# Patient Record
Sex: FEMALE | Race: WHITE | NOT HISPANIC OR LATINO | Employment: OTHER | URBAN - METROPOLITAN AREA
[De-identification: names, ages, dates, MRNs, and addresses within clinical notes are randomized per-mention and may not be internally consistent; named-entity substitution may affect disease eponyms.]

---

## 2017-01-27 DIAGNOSIS — M25.559 ARTHRALGIA OF HIP, UNSPECIFIED LATERALITY: Primary | ICD-10-CM

## 2017-01-30 ENCOUNTER — HOSPITAL ENCOUNTER (OUTPATIENT)
Dept: RADIOLOGY | Facility: HOSPITAL | Age: 52
Discharge: HOME | End: 2017-01-30
Attending: ORTHOPAEDIC SURGERY
Payer: MEDICAID

## 2017-01-30 ENCOUNTER — OFFICE VISIT (OUTPATIENT)
Dept: ORTHOPEDICS | Facility: CLINIC | Age: 52
End: 2017-01-30
Payer: MEDICAID

## 2017-01-30 VITALS — BODY MASS INDEX: 24.24 KG/M2 | WEIGHT: 142 LBS | HEIGHT: 64 IN

## 2017-01-30 DIAGNOSIS — M54.32 BILATERAL SCIATICA: Primary | ICD-10-CM

## 2017-01-30 DIAGNOSIS — M54.31 BILATERAL SCIATICA: Primary | ICD-10-CM

## 2017-01-30 DIAGNOSIS — M25.559 ARTHRALGIA OF HIP, UNSPECIFIED LATERALITY: ICD-10-CM

## 2017-01-30 PROCEDURE — 73521 X-RAY EXAM HIPS BI 2 VIEWS: CPT | Mod: 26,,, | Performed by: RADIOLOGY

## 2017-01-30 PROCEDURE — 99999 PR PBB SHADOW E&M-EST. PATIENT-LVL II: CPT | Mod: PBBFAC,,, | Performed by: ORTHOPAEDIC SURGERY

## 2017-01-30 PROCEDURE — 99212 OFFICE O/P EST SF 10 MIN: CPT | Mod: PBBFAC,PO | Performed by: ORTHOPAEDIC SURGERY

## 2017-01-30 PROCEDURE — 99203 OFFICE O/P NEW LOW 30 MIN: CPT | Mod: S$PBB,,, | Performed by: ORTHOPAEDIC SURGERY

## 2017-01-30 RX ORDER — GABAPENTIN 300 MG/1
CAPSULE ORAL
Refills: 7 | Status: ON HOLD | COMMUNITY
Start: 2017-01-27 | End: 2017-03-11 | Stop reason: HOSPADM

## 2017-01-30 RX ORDER — GABAPENTIN 800 MG/1
800 TABLET ORAL 3 TIMES DAILY
Refills: 3 | COMMUNITY
Start: 2016-11-01

## 2017-01-30 RX ORDER — ALENDRONATE SODIUM 70 MG/1
TABLET ORAL
Refills: 2 | COMMUNITY
Start: 2016-10-31

## 2017-01-30 RX ORDER — GABAPENTIN 600 MG/1
TABLET ORAL
Refills: 7 | Status: ON HOLD | COMMUNITY
Start: 2016-10-30 | End: 2017-03-11 | Stop reason: HOSPADM

## 2017-01-30 RX ORDER — CYCLOBENZAPRINE HCL 10 MG
TABLET ORAL
Refills: 0 | COMMUNITY
Start: 2016-11-01 | End: 2017-01-31

## 2017-01-30 RX ORDER — CLONAZEPAM 1 MG/1
1 TABLET ORAL 2 TIMES DAILY PRN
Refills: 1 | Status: ON HOLD | COMMUNITY
Start: 2016-11-02 | End: 2017-03-11 | Stop reason: HOSPADM

## 2017-01-30 RX ORDER — MIRTAZAPINE 15 MG/1
15 TABLET, FILM COATED ORAL NIGHTLY
Refills: 1 | Status: ON HOLD | COMMUNITY
Start: 2016-11-02 | End: 2017-03-11 | Stop reason: HOSPADM

## 2017-01-30 RX ORDER — ACETAMINOPHEN AND CODEINE PHOSPHATE 300; 60 MG/1; MG/1
60 TABLET ORAL
Qty: 22 TABLET | Refills: 0 | Status: SHIPPED | OUTPATIENT
Start: 2017-01-30 | End: 2017-01-31

## 2017-01-30 RX ORDER — IBUPROFEN 800 MG/1
TABLET ORAL
Refills: 0 | COMMUNITY
Start: 2016-11-01 | End: 2017-01-31

## 2017-01-30 RX ORDER — DULOXETIN HYDROCHLORIDE 60 MG/1
60 CAPSULE, DELAYED RELEASE ORAL DAILY
Refills: 1 | COMMUNITY
Start: 2016-11-02
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
## 2017-01-30 NOTE — LETTER
January 30, 2017      Anel Causey, DO  [address] [address]           [facility]  [address] [facility] [address] [place] [address]  Phone: [phone]          Patient: Elaine Arias   MR Number: [id number]   YOB: 1965   Date of Visit: 1/30/2017       Dear Dr. Anel Causey:    Thank you for referring Elaine Arias to me for evaluation. Attached you will find relevant portions of my assessment and plan of care.    If you have questions, please do not hesitate to call me. I look forward to following Elaine Arias along with you.    Sincerely,    Patrick Stephens MD    Enclosure  CC:  No Recipients    If you would like to receive this communication electronically, please contact [email or web address] or [phone] to request more information on [facility] access.    For providers and/or their staff who would like to refer a patient to [facility], please contact us through our one-stop-shop provider referral line, Connor Dickens, at [phone].    If you feel you have received this communication in error or would no longer like to receive these types of communications, please e-mail [email or web address]

## 2017-01-31 ENCOUNTER — OFFICE VISIT (OUTPATIENT)
Dept: ORTHOPEDIC SURGERY | Facility: CLINIC | Age: 52
End: 2017-01-31
Payer: MEDICAID

## 2017-01-31 VITALS
BODY MASS INDEX: 24.88 KG/M2 | DIASTOLIC BLOOD PRESSURE: 70 MMHG | SYSTOLIC BLOOD PRESSURE: 116 MMHG | HEART RATE: 108 BPM | WEIGHT: 145.75 LBS | HEIGHT: 64 IN

## 2017-01-31 DIAGNOSIS — G89.29 CHRONIC BILATERAL LOW BACK PAIN WITH BILATERAL SCIATICA: ICD-10-CM

## 2017-01-31 DIAGNOSIS — M43.16 SPONDYLOLISTHESIS, LUMBAR REGION: Primary | ICD-10-CM

## 2017-01-31 DIAGNOSIS — M54.41 CHRONIC BILATERAL LOW BACK PAIN WITH BILATERAL SCIATICA: ICD-10-CM

## 2017-01-31 DIAGNOSIS — M54.42 CHRONIC BILATERAL LOW BACK PAIN WITH BILATERAL SCIATICA: ICD-10-CM

## 2017-01-31 PROCEDURE — 99204 OFFICE O/P NEW MOD 45 MIN: CPT | Mod: S$PBB,,, | Performed by: PHYSICIAN ASSISTANT

## 2017-01-31 PROCEDURE — 99999 PR PBB SHADOW E&M-EST. PATIENT-LVL III: CPT | Mod: PBBFAC,,, | Performed by: PHYSICIAN ASSISTANT

## 2017-01-31 PROCEDURE — 99213 OFFICE O/P EST LOW 20 MIN: CPT | Mod: PBBFAC | Performed by: PHYSICIAN ASSISTANT

## 2017-01-31 RX ORDER — TIZANIDINE 4 MG/1
TABLET ORAL
Refills: 2 | COMMUNITY
Start: 2016-11-01 | End: 2017-01-31

## 2017-01-31 RX ORDER — TRAMADOL HYDROCHLORIDE 50 MG/1
50 TABLET ORAL EVERY 8 HOURS PRN
Qty: 20 TABLET | Refills: 0 | Status: ON HOLD | OUTPATIENT
Start: 2017-01-31 | End: 2017-03-10 | Stop reason: CLARIF

## 2017-01-31 RX ORDER — IBUPROFEN 200 MG
TABLET ORAL
Refills: 3 | COMMUNITY
Start: 2016-10-31

## 2017-01-31 RX ORDER — CYCLOBENZAPRINE HCL 10 MG
10 TABLET ORAL EVERY 8 HOURS PRN
Qty: 20 TABLET | Refills: 0 | Status: SHIPPED | OUTPATIENT
Start: 2017-01-31 | End: 2017-02-10

## 2017-01-31 NOTE — Clinical Note
Dr. Chavez -  Ms. Arias has Grade II spondylolisthesis at L5/S1 with inflammatory endplate changes and disk changes.  I would like for her to see you or Dr. RONI hardin.  Do you have a preference?  Her 2016 MRI has been uploaded and she is scheduled to have an updated one as well.  Mariya CARABALLO

## 2017-01-31 NOTE — MR AVS SNAPSHOT
[facility]  [address] [facility] [address]  [facility] [address]  Phone: [phone]  Fax: [phone]                  Elaine Arias   2017 1:30 PM   Office Visit    Description:  Female : 1965   Provider:  Mariya Zhao PA-C   Department:  [facility]           Reason for Visit     Low-back Pain           Diagnoses this Visit        Comments    Spondylolisthesis, lumbar region    -  Primary     Chronic bilateral low back pain with bilateral sciatica                To Do List           Future Appointments        Provider Department Dept Phone    2017 8:00 AM [facility] MRI1 [facility] [phone]    2017 9:00 AM [facility] XR3 [facility] [phone]      Goals (5 Years of Data)     None      [facility]     [facility] -  Assistance  Registered nurses in the [facility] provide clinical advisement, health education, appointment booking, and other advisory services.  Call for this free service at [phone].             Medications           Message regarding Medications     Verify the changes and/or additions to your medication regime listed below are the same as discussed with your clinician today.  If any of these changes or additions are incorrect, please notify your healthcare provider.        STOP taking these medications     tizanidine (ZANAFLEX) 4 MG tablet take 1 tablet by Oral route 2 times per day as needed not to exceed 3 doses in 24 hours PRN M54.89 med nec    acetaminophen-codeine 300-60mg (TYLENOL #4) 300-60 mg Tab Take 1 tablet by mouth every 6 (six) hours while awake.    cyclobenzaprine (FLEXERIL) 10 MG tablet     ibuprofen (ADVIL,MOTRIN) 800 MG tablet            Verify that the below list of medications is an accurate representation of the medications you are currently taking.  If none reported, the list may be blank. If incorrect, please contact your healthcare  "provider. Carry this list with you in case of emergency.           Current Medications     alendronate (FOSAMAX) 70 MG tablet take 1 Tablet by Oral route 1 time per week M81.0 med nec    clonazePAM (KLONOPIN) 1 MG tablet Take 1 mg by mouth 2 (two) times daily as needed.    duloxetine (CYMBALTA) 60 MG capsule Take 60 mg by mouth once daily.    gabapentin (NEURONTIN) 300 MG capsule TK 1 C PO TID    gabapentin (NEURONTIN) 600 MG tablet TK 1 TABLET PO TID    gabapentin (NEURONTIN) 800 MG tablet Take 800 mg by mouth 3 (three) times daily.    mirtazapine (REMERON) 15 MG tablet Take 15 mg by mouth every evening.    nicotine (NICODERM CQ) 14 mg/24 hr apply one PATCH SKIN daily           Clinical Reference Information           Vital Signs - Last Recorded  Most recent update: 1/31/2017  1:51 PM by Sonam Mcallister LPN    BP Pulse Ht Wt BMI    116/70 (BP Location: Left arm, Patient Position: Sitting, BP Method: Automatic) 108 5' 4" (1.626 m) 66.1 kg (145 lb 11.6 oz) 25.01 kg/m2      Blood Pressure          Most Recent Value    BP  116/70      Allergies as of 1/31/2017     Pcn [Penicillins]    Zithromax [Azithromycin]      Immunizations Administered on Date of Encounter - 1/31/2017     None      Orders Placed During Today's Visit     Future Labs/Procedures Expected by Expires    MRI Lumbar Spine W WO Cont  1/31/2017 1/31/2018    X-Ray Lumbar Complete With Flex And Ext  1/31/2017 1/31/2018      [facility] Sign-Up     Activating your [facility] account is as easy as 1-2-3!     1) Visit [email or web address], select Sign Up Now, enter this activation code and your date of birth, then select Next.  [email or web address]  Expires: 3/17/2017  3:03 PM      2) Create a username and password to use when you visit [facility] in the future and select a security question in case you lose your password and select Next.    3) Enter your e-mail address and click Sign Up!    Additional Information  If you have questions, please e-mail " [email or web address] or call [phone] to talk to our [facility] staff. Remember, [facility] is NOT to be used for urgent needs. For medical emergencies, dial 911.         Smoking Cessation     If you would like to quit smoking:   You may be eligible for free services if you are a [place] resident and started smoking cigarettes before September 1, 1988.  Call the Smoking Cessation Trust ([facility]) toll free at [phone] or [phone].   Call 4-602-QUIT-NOW if you do not meet the above criteria.
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
## 2017-02-07 ENCOUNTER — TELEPHONE (OUTPATIENT)
Dept: NEUROSURGERY | Facility: CLINIC | Age: 52
End: 2017-02-07

## 2017-02-07 NOTE — TELEPHONE ENCOUNTER
----- Message from Mariya Zhao PA-C sent at 2/6/2017  1:47 PM CST -----  Yesi/ Modesta -     Please arrange for her to see Kathy Browne or Dr. Dsouza.   She has an MRI Scheduled for 2/14/17 so it would need to be after that.    Thanks.    Mariya Zhao.    ----- Message -----     From: Dk Chavez MD     Sent: 2/6/2017   1:26 PM       To: Mariya Zhao PA-C    Both/either of us are happy to see her. Once you have seen a Medicaid patient we are sort of obligated to accept internal referrals.    ----- Message -----     From: Mariya Zhao PA-C     Sent: 2/6/2017  11:38 AM       To: MD Dr. Kathy Hammonds -     She has significant spondylolisthesis - Grade II at L5/S1.  She is a Medicaid Patient.  Should I have her see you or Dr. Dsouza?  She is scheduled for MRI and lumbar flex/ext xrays on 2-14-17.    Mariya Zhao

## 2017-02-14 ENCOUNTER — HOSPITAL ENCOUNTER (OUTPATIENT)
Dept: RADIOLOGY | Facility: HOSPITAL | Age: 52
Discharge: HOME | End: 2017-02-14
Attending: NEUROLOGICAL SURGERY
Payer: MEDICAID

## 2017-02-14 DIAGNOSIS — M43.16 SPONDYLOLISTHESIS, LUMBAR REGION: ICD-10-CM

## 2017-02-14 DIAGNOSIS — M54.42 CHRONIC BILATERAL LOW BACK PAIN WITH BILATERAL SCIATICA: ICD-10-CM

## 2017-02-14 DIAGNOSIS — M54.41 CHRONIC BILATERAL LOW BACK PAIN WITH BILATERAL SCIATICA: ICD-10-CM

## 2017-02-14 DIAGNOSIS — G89.29 CHRONIC BILATERAL LOW BACK PAIN WITH BILATERAL SCIATICA: ICD-10-CM

## 2017-02-14 PROCEDURE — 72158 MRI LUMBAR SPINE W/O & W/DYE: CPT | Mod: TC,PO

## 2017-02-14 PROCEDURE — 72114 X-RAY EXAM L-S SPINE BENDING: CPT | Mod: TC,PO

## 2017-02-14 PROCEDURE — A9585 GADOBUTROL INJECTION: HCPCS | Mod: PO | Performed by: NEUROLOGICAL SURGERY

## 2017-02-14 PROCEDURE — 72114 X-RAY EXAM L-S SPINE BENDING: CPT | Mod: 26,,, | Performed by: RADIOLOGY

## 2017-02-14 PROCEDURE — 25500020 PHARM REV CODE 255: Mod: PO | Performed by: NEUROLOGICAL SURGERY

## 2017-02-14 PROCEDURE — 72158 MRI LUMBAR SPINE W/O & W/DYE: CPT | Mod: 26,,, | Performed by: RADIOLOGY

## 2017-02-14 RX ORDER — GADOBUTROL 604.72 MG/ML
6 INJECTION INTRAVENOUS
Status: COMPLETED | OUTPATIENT
Start: 2017-02-14 | End: 2017-02-14

## 2017-02-14 RX ADMIN — GADOBUTROL 6 ML: 604.72 INJECTION INTRAVENOUS at 08:02

## 2017-02-22 ENCOUNTER — OFFICE VISIT (OUTPATIENT)
Dept: NEUROSURGERY | Facility: CLINIC | Age: 52
End: 2017-02-22
Payer: MEDICAID

## 2017-02-22 VITALS
HEIGHT: 64 IN | DIASTOLIC BLOOD PRESSURE: 95 MMHG | WEIGHT: 149 LBS | SYSTOLIC BLOOD PRESSURE: 191 MMHG | HEART RATE: 114 BPM | BODY MASS INDEX: 25.44 KG/M2

## 2017-02-22 DIAGNOSIS — M43.10 SPONDYLOLISTHESIS, GRADE 3: ICD-10-CM

## 2017-02-22 DIAGNOSIS — Z87.39 PERSONAL HISTORY OF OSTEOMYELITIS: ICD-10-CM

## 2017-02-22 DIAGNOSIS — M25.552 CHRONIC LEFT HIP PAIN: ICD-10-CM

## 2017-02-22 DIAGNOSIS — R29.898 LEFT LEG WEAKNESS: ICD-10-CM

## 2017-02-22 DIAGNOSIS — G89.4 CHRONIC PAIN SYNDROME: ICD-10-CM

## 2017-02-22 DIAGNOSIS — Z72.0 CONTINUOUS TOBACCO ABUSE: ICD-10-CM

## 2017-02-22 DIAGNOSIS — G89.29 CHRONIC LEFT HIP PAIN: ICD-10-CM

## 2017-02-22 DIAGNOSIS — M48.062 LUMBAR STENOSIS WITH NEUROGENIC CLAUDICATION: ICD-10-CM

## 2017-02-22 PROCEDURE — 99215 OFFICE O/P EST HI 40 MIN: CPT | Mod: S$GLB,,, | Performed by: NEUROLOGICAL SURGERY

## 2017-02-22 NOTE — MR AVS SNAPSHOT
[facility] Neurosurgery  [address]  [facility] [address]  Phone: [phone]  Fax: [phone]                  Elaine Arias   2017 12:00 PM   Office Visit    Description:  Female : 1965   Provider:  Dk Chavez MD   Department:  [facility] - Neurosurgery           Reason for Visit     Lumbar Spine Pain (L-Spine)           Diagnoses this Visit        Comments    Spondylolisthesis, grade 3         Lumbar stenosis with neurogenic claudication         Chronic pain syndrome         Chronic left hip pain         Continuous tobacco abuse         Left leg weakness         Personal history of osteomyelitis                To Do List           Goals (5 Years of Data)     None      [facility]     [facility] Nurse Care Line -  Assistance  Registered nurses in the [facility] Center provide clinical advisement, health education, appointment booking, and other advisory services.  Call for this free service at [phone].             Medications           Message regarding Medications     Verify the changes and/or additions to your medication regime listed below are the same as discussed with your clinician today.  If any of these changes or additions are incorrect, please notify your healthcare provider.             Verify that the below list of medications is an accurate representation of the medications you are currently taking.  If none reported, the list may be blank. If incorrect, please contact your healthcare provider. Carry this list with you in case of emergency.           Current Medications     alendronate (FOSAMAX) 70 MG tablet take 1 Tablet by Oral route 1 time per week M81.0 med nec    clonazePAM (KLONOPIN) 1 MG tablet Take 1 mg by mouth 2 (two) times daily as needed.    duloxetine (CYMBALTA) 60 MG capsule Take 60 mg by mouth once daily.    gabapentin (NEURONTIN) 300 MG capsule TK 1 C PO TID    gabapentin (NEURONTIN) 600 MG tablet TK 1 TABLET PO TID     "gabapentin (NEURONTIN) 800 MG tablet Take 800 mg by mouth 3 (three) times daily.    mirtazapine (REMERON) 15 MG tablet Take 15 mg by mouth every evening.    nicotine (NICODERM CQ) 14 mg/24 hr apply one PATCH SKIN daily    tramadol (ULTRAM) 50 mg tablet Take 1 tablet (50 mg total) by mouth every 8 (eight) hours as needed for Pain.           Clinical Reference Information           Your Vitals Were     BP Pulse Height Weight BMI    191/95 114 5' 4" (1.626 m) 67.6 kg (149 lb) 25.58 kg/m2      Blood Pressure          Most Recent Value    BP  (!)  191/95      Allergies as of 2/22/2017     Pcn [Penicillins]    Zithromax [Azithromycin]      Immunizations Administered on Date of Encounter - 2/22/2017     None      Smoking Cessation     If you would like to quit smoking:   You may be eligible for free services if you are a [place] resident and started smoking cigarettes before September 1, 1988.  Call the Smoking Cessation Trust ([facility]) toll free at [phone] or [phone].   Call -925-QUIT-NOW if you do not meet the above criteria.            Language Assistance Services     ATTENTION: Language assistance services are available, free of charge. Please call [phone].      ATENCIÓN: Si habla mar, tiene a montanez disposición servicios gratuitos de asistencia lingüística. Llame al [phone].     CHÚ Ý: N?u b?n nói Ti?ng Vi?t, có các d?ch v? h? tr? ngôn ng? mi?n phí dành cho b?n. G?i s? [phone].         [facility] complies with applicable Federal civil rights laws and does not discriminate on the basis of race, color, national origin, age, disability, or sex.        "

## 2017-02-22 NOTE — PROGRESS NOTES
Neurosurgery History & Physical    Patient ID: Elaine Arias is a 52 y.o. female.    Chief Complaint   Patient presents with    Lumbar Spine Pain (L-Spine)     1 year history of worsening, moderate-severe low back pain noting pain, numbness, weakness in her BLE to feet. Denies bladder or bowel dysfunction. Pain is increased by standing. Pain is slightly alleviated with NSAIDs and Gabapentin. She has not done PT nor has she had injections. Patient states she has a history of osteomyelitis of the lumbar spine.        Review of Systems   Constitutional: Negative for activity change, appetite change, chills, fever and unexpected weight change.   HENT: Negative for tinnitus, trouble swallowing and voice change.    Respiratory: Negative for apnea, cough, chest tightness and shortness of breath.    Cardiovascular: Negative for chest pain and palpitations.   Gastrointestinal: Negative for constipation, diarrhea, nausea and vomiting.   Genitourinary: Positive for pelvic pain and vaginal pain. Negative for difficulty urinating, dysuria, frequency and urgency.   Musculoskeletal: Positive for back pain. Negative for gait problem, neck pain and neck stiffness.   Skin: Negative for wound.   Neurological: Negative for dizziness, tremors, seizures, facial asymmetry, speech difficulty, weakness, light-headedness, numbness and headaches.   Psychiatric/Behavioral: Negative for confusion and decreased concentration.       Past Medical History   Diagnosis Date    Hypertension      Social History     Social History    Marital status: Single     Spouse name: N/A    Number of children: N/A    Years of education: N/A     Occupational History    Not on file.     Social History Main Topics    Smoking status: Current Every Day Smoker    Smokeless tobacco: Not on file    Alcohol use Not on file    Drug use: Not on file    Sexual activity: Not on file     Other Topics Concern    Not on file     Social History Narrative     History  "reviewed. No pertinent family history.  Review of patient's allergies indicates:   Allergen Reactions    Pcn [penicillins]     Zithromax [azithromycin]        Current Outpatient Prescriptions:     alendronate (FOSAMAX) 70 MG tablet, take 1 Tablet by Oral route 1 time per week M81.0 med nec, Disp: , Rfl: 2    clonazePAM (KLONOPIN) 1 MG tablet, Take 1 mg by mouth 2 (two) times daily as needed., Disp: , Rfl: 1    duloxetine (CYMBALTA) 60 MG capsule, Take 60 mg by mouth once daily., Disp: , Rfl: 1    gabapentin (NEURONTIN) 300 MG capsule, TK 1 C PO TID, Disp: , Rfl: 7    gabapentin (NEURONTIN) 600 MG tablet, TK 1 TABLET PO TID, Disp: , Rfl: 7    gabapentin (NEURONTIN) 800 MG tablet, Take 800 mg by mouth 3 (three) times daily., Disp: , Rfl: 3    mirtazapine (REMERON) 15 MG tablet, Take 15 mg by mouth every evening., Disp: , Rfl: 1    nicotine (NICODERM CQ) 14 mg/24 hr, apply one PATCH SKIN daily, Disp: , Rfl: 3    tramadol (ULTRAM) 50 mg tablet, Take 1 tablet (50 mg total) by mouth every 8 (eight) hours as needed for Pain., Disp: 20 tablet, Rfl: 0    Vitals:    02/22/17 1155   BP: (!) 191/95   Pulse: (!) 114   Weight: 67.6 kg (149 lb)   Height: 5' 4" (1.626 m)       Physical Exam   Constitutional: She is oriented to person, place, and time. She appears well-developed and well-nourished.   HENT:   Head: Normocephalic and atraumatic.   Eyes: Pupils are equal, round, and reactive to light.   Neck: Normal range of motion. Neck supple.   Cardiovascular: Normal rate.    Pulmonary/Chest: Effort normal.   Musculoskeletal: Normal range of motion. She exhibits no edema.   Neurological: She is alert and oriented to person, place, and time. She has an abnormal Tandem Gait Test. She has a normal Finger-Nose-Finger Test, a normal Heel to Doty Test and a normal Romberg Test.   Reflex Scores:       Tricep reflexes are 3+ on the right side and 2+ on the left side.       Bicep reflexes are 3+ on the right side and 2+ on the " left side.       Brachioradialis reflexes are 3+ on the right side and 2+ on the left side.       Patellar reflexes are 3+ on the right side and 2+ on the left side.       Achilles reflexes are 3+ on the right side and 1+ on the left side.  Skin: Skin is warm, dry and intact.   Psychiatric: She has a normal mood and affect. Her speech is normal and behavior is normal. Judgment and thought content normal.   Nursing note and vitals reviewed.      Neurologic Exam     Mental Status   Oriented to person, place, and time.   Oriented to person.   Oriented to place.   Oriented to time.   Follows 3 step commands.   Attention: normal. Concentration: normal.   Speech: speech is normal   Level of consciousness: alert  Knowledge: consistent with education.   Able to name object. Normal comprehension.     Cranial Nerves     CN II   Visual acuity: normal  Right visual field deficit: none  Left visual field deficit: none     CN III, IV, VI   Pupils are equal, round, and reactive to light.  Right pupil: Size: 3 mm. Shape: regular. Reactivity: brisk. Consensual response: intact.   Left pupil: Size: 3 mm. Shape: regular. Reactivity: brisk. Consensual response: intact.   CN III: no CN III palsy  CN VI: no CN VI palsy  Nystagmus: none   Diplopia: none  Ophthalmoparesis: none  Conjugate gaze: present    CN V   Right facial sensation deficit: none  Left facial sensation deficit: none    CN VII   Right facial weakness: none  Left facial weakness: none    CN VIII   Hearing: intact    CN IX, X   CN IX normal.   CN X normal.     CN XI   Right sternocleidomastoid strength: normal  Left sternocleidomastoid strength: normal  Right trapezius strength: normal  Left trapezius strength: normal    CN XII   Fasciculations: absent  Tongue deviation: none    Motor Exam   Muscle bulk: normal  Overall muscle tone: normal  Right arm pronator drift: absent  Left arm pronator drift: absent    Strength   Strength 5/5 except as noted.   Left iliopsoas:  4/5  Left quadriceps: 4/5  Left anterior tibial: 4/5  Left posterior tibial: 4/5  Left peroneal: 4/5  Left gastroc: 4/5       Left leg weakness 4/5 proximal and 4-/5 distal     Sensory Exam   Right arm light touch: normal  Left arm light touch: normal  Right leg light touch: decreased from ankle  Left leg light touch: decreased from knee  Right arm vibration: normal  Left arm vibration: normal  Right leg vibration: decreased from ankle  Left leg vibration: decreased from knee  Left leg proprioception: decreased from knee  Right arm pinprick: normal  Left arm pinprick: normal  Right leg pinprick: decreased from ankle  Left leg pinprick: decreased from ankle  Sensory deficit distribution on right: L5  Sensory deficit distribution on left: L4    Gait, Coordination, and Reflexes     Gait  Gait: wide-based (slow, antalgic)    Coordination   Romberg: negative  Finger to nose coordination: normal  Heel to shin coordination: normal  Tandem walking coordination: abnormal    Tremor   Resting tremor: absent  Intention tremor: absent  Action tremor: absent    Reflexes   Right brachioradialis: 3+  Left brachioradialis: 2+  Right biceps: 3+  Left biceps: 2+  Right triceps: 3+  Left triceps: 2+  Right patellar: 3+  Left patellar: 2+  Right achilles: 3+  Left achilles: 1+  Right Todd: absent  Left Todd: absent  Right ankle clonus: absent  Left ankle clonus: absent       Antalgic gait, forward stooped posture       Provider dictation:  The patient is a 51 year old  female smoker with chronic lower back pain is seen by Mariya BROWN after a referral from Dr. Causey who saw her for left hip pain. This was attributed to her low back and  Pavel evaluated her recently.  Pain has increased over the last few months.  Pain is felt across the lower back with radiation to the posterior buttocks, posterior thighs to the knees.  She feels her left leg is weak and she has to pick it up with her hands when transferring.  Any activity increases pain and she cannot walk more than a few yards.  She takes tylenol and gabapentin for pain.  She has completed a steroid taper recently.  She has not had PT or MARIAH.  She also describes associated buttocks and vaginal area pain/ warm sensations that occur a few times per week.  She has had episodes of urinary incontince with these episodes at times.  She sees a psychologist for depression.      She was diagnosed with treated with discitis/osteomyelitis in the lumbar spine in March/ April 2016 and received IV antibiotics for over a month.    Oswestry score: 78%.      PHQ:  14.    On exam, she smells of tobacco smoke.  She appears uncomfortable and frequently chagnes positions.  She has a slow antalgic gait.  She has 4/5 weakness in the left lower extremity.  She has diminished muscle stretch reflexes in the left leg, and clear sensory deficits.      I have reviewed an MRI of the lumbar spine from [facility] dated 4-24-16 and a new scan from [facility] earlier this month:  She has a lumbar type disc at S1-2 and by my counting there is Grade II-III spondylolisthesis of L5 - S1 as a result of bilateral L5 pars defects.  There is chronic spondylodiscitis with no evidence of acute or on-going infection. There is severe central canal narrowing and bilateral foraminal narrowing at this level worse on the left.     This lady with severe lumbosacral spondylolisthesis (grade 3) and Osteomyelitis last year has left leg weakness and severe disabilty. She will require a lumbosacral decompression and fusion with instrumentation and TLIF. I would prefer to undertake this after she is tobacco free but her leg weakness merits urgent surgery. She is starting a tobacco quitting program with her  tomorrow at [facility] in [place].    We will plan on surgery in the near future.    Visit Diagnosis:  There are no diagnoses linked to this encounter.    Total time spent counseling greater than fifty  percent of total visit time.  Counseling included discussion regarding imaging findings, diagnosis possibilities, treatment options, risks and benefits.   The patient had many questions regarding the options and long-term effects.

## 2017-03-01 ENCOUNTER — HOSPITAL ENCOUNTER (EMERGENCY)
Facility: HOSPITAL | Age: 52
Discharge: HOME | End: 2017-03-01
Attending: EMERGENCY MEDICINE
Payer: MEDICAID

## 2017-03-01 VITALS
WEIGHT: 149 LBS | TEMPERATURE: 99 F | OXYGEN SATURATION: 99 % | DIASTOLIC BLOOD PRESSURE: 72 MMHG | HEART RATE: 74 BPM | HEIGHT: 64 IN | SYSTOLIC BLOOD PRESSURE: 134 MMHG | BODY MASS INDEX: 25.44 KG/M2 | RESPIRATION RATE: 18 BRPM
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
## 2017-03-01 DIAGNOSIS — S52.591A OTHER CLOSED FRACTURE OF DISTAL END OF RIGHT RADIUS, INITIAL ENCOUNTER: Primary | ICD-10-CM
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
## 2017-03-01 DIAGNOSIS — T14.8XXA FRACTURE: ICD-10-CM
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
## 2017-03-01 PROCEDURE — 25000003 PHARM REV CODE 250

## 2017-03-01 PROCEDURE — 96372 THER/PROPH/DIAG INJ SC/IM: CPT

## 2017-03-01 PROCEDURE — 25605 CLTX DST RDL FX/EPHYS SEP W/: CPT | Mod: RT

## 2017-03-01 PROCEDURE — 99284 EMERGENCY DEPT VISIT MOD MDM: CPT | Mod: 25

## 2017-03-01 PROCEDURE — 25000003 PHARM REV CODE 250: Performed by: PHYSICIAN ASSISTANT

## 2017-03-01 PROCEDURE — 63600175 PHARM REV CODE 636 W HCPCS: Performed by: PHYSICIAN ASSISTANT

## 2017-03-01 RX ORDER — HYDROCODONE BITARTRATE AND ACETAMINOPHEN 5; 325 MG/1; MG/1
1 TABLET ORAL EVERY 8 HOURS PRN
Qty: 12 TABLET | Refills: 0 | Status: SHIPPED | OUTPATIENT
Start: 2017-03-01

## 2017-03-01 RX ORDER — MORPHINE SULFATE 2 MG/ML
6 INJECTION, SOLUTION INTRAMUSCULAR; INTRAVENOUS
Status: COMPLETED | OUTPATIENT
Start: 2017-03-01 | End: 2017-03-01

## 2017-03-01 RX ORDER — HYDROXYZINE HYDROCHLORIDE 25 MG/1
25 TABLET, FILM COATED ORAL 2 TIMES DAILY
COMMUNITY

## 2017-03-01 RX ORDER — HYDROCODONE BITARTRATE AND ACETAMINOPHEN 5; 325 MG/1; MG/1
1 TABLET ORAL
Status: COMPLETED | OUTPATIENT
Start: 2017-03-01 | End: 2017-03-01

## 2017-03-01 RX ORDER — MIDAZOLAM HYDROCHLORIDE 5 MG/ML
2 INJECTION INTRAMUSCULAR; INTRAVENOUS
Status: COMPLETED | OUTPATIENT
Start: 2017-03-01 | End: 2017-03-01

## 2017-03-01 RX ORDER — DOCUSATE SODIUM 100 MG/1
100 CAPSULE, LIQUID FILLED ORAL 2 TIMES DAILY PRN
COMMUNITY

## 2017-03-01 RX ORDER — LIDOCAINE HYDROCHLORIDE 10 MG/ML
10 INJECTION INFILTRATION; PERINEURAL
Status: COMPLETED | OUTPATIENT
Start: 2017-03-01 | End: 2017-03-01

## 2017-03-01 RX ORDER — TIZANIDINE 2 MG/1
2 TABLET ORAL 2 TIMES DAILY
COMMUNITY

## 2017-03-01 RX ORDER — LIDOCAINE HYDROCHLORIDE 10 MG/ML
INJECTION, SOLUTION EPIDURAL; INFILTRATION; INTRACAUDAL; PERINEURAL
Status: COMPLETED
Start: 2017-03-01 | End: 2017-03-01

## 2017-03-01 RX ORDER — LISINOPRIL AND HYDROCHLOROTHIAZIDE 20; 25 MG/1; MG/1
1 TABLET ORAL DAILY
COMMUNITY

## 2017-03-01 RX ORDER — AMLODIPINE BESYLATE 10 MG/1
10 TABLET ORAL DAILY
COMMUNITY

## 2017-03-01 RX ADMIN — LIDOCAINE HYDROCHLORIDE 10 ML: 10 INJECTION INFILTRATION; PERINEURAL at 03:03

## 2017-03-01 RX ADMIN — LIDOCAINE HYDROCHLORIDE 10 ML: 10 INJECTION, SOLUTION INFILTRATION; PERINEURAL at 03:03

## 2017-03-01 RX ADMIN — MORPHINE SULFATE 6 MG: 2 INJECTION, SOLUTION INTRAMUSCULAR; INTRAVENOUS at 03:03

## 2017-03-01 RX ADMIN — HYDROCODONE BITARTRATE AND ACETAMINOPHEN 1 TABLET: 5; 325 TABLET ORAL at 02:03

## 2017-03-01 RX ADMIN — MIDAZOLAM HYDROCHLORIDE 2 MG: 5 INJECTION, SOLUTION INTRAMUSCULAR; INTRAVENOUS at 03:03

## 2017-03-01 NOTE — ED AVS SNAPSHOT
[facility] [address] [place] [address]               Elaine Arias   3/1/2017  2:16 PM   ED    Description:  Female : 1965   Department:  [facility]           Your Care was Coordinated By:     Provider Role From To    Sukhdev Summers MD Attending Provider 17 [id number] --    Anastasiya Rivers PA-C Physician Assistant 17 [phone] --      Reason for Visit     Arm Injury           Diagnoses this Visit        Comments    Other closed fracture of distal end of right radius, initial encounter    -  Primary     Fall         Fall at home, initial encounter         Fracture           ED Disposition     ED Disposition Condition Comment    Discharge             To Do List           Follow-up Information     Follow up with Anel Causey DO In 1 week.    Specialty:  Family Medicine    Contact information:    [address] [facility] [address] [address]  [phone]          Schedule an appointment as soon as possible for a visit with Deepak Fernandez MD.    Specialty:  Orthopedic Surgery    Contact information:    [address]  [address]  [facility]  [place] [address]  [phone]          Follow up with [facility].    Specialty:  Emergency Medicine    Why:  If symptoms worsen    Contact information:    [address] [place] [address] [phone]       These Medications        Disp Refills Start End    hydrocodone-acetaminophen 5-325mg (NORCO) 5-325 mg per tablet 12 tablet 0 3/1/2017     Take 1 tablet by mouth every 8 (eight) hours as needed. - Oral    Pharmacy: [facility] - [place], [place] - [address] Ph #: [phone]         [facility] On Call     [facility] Nurse Care Line -  Assistance  Registered nurses in the [facility] provide clinical advisement, health education, appointment booking, and  other advisory services.  Call for this free service at [phone].             Medications           Message regarding Medications     Verify the changes and/or additions to your medication regime listed below are the same as discussed with your clinician today.  If any of these changes or additions are incorrect, please notify your healthcare provider.        START taking these NEW medications        Refills    hydrocodone-acetaminophen 5-325mg (NORCO) 5-325 mg per tablet 0    Sig: Take 1 tablet by mouth every 8 (eight) hours as needed.    Class: Print    Route: Oral      These medications were administered today        Dose Freq    hydrocodone-acetaminophen 5-325mg per tablet 1 tablet 1 tablet ED 1 Time    Sig: Take 1 tablet by mouth ED 1 Time.    Class: Normal    Route: Oral    morphine injection 6 mg 6 mg ED 1 Time    Sig: Inject 3 mLs (6 mg total) into the muscle ED 1 Time.    Class: Normal    Route: Intramuscular    midazolam injection 2 mg 2 mg ED 1 Time    Sig: Inject 0.4 mLs (2 mg total) into the muscle ED 1 Time.    Class: Normal    Route: Intramuscular    lidocaine HCL 10 mg/ml (1%) injection 10 mL 10 mL ED 1 Time    Sig: 10 mLs by Infiltration route ED 1 Time.    Class: Normal    Route: Infiltration    lidocaine (PF) 10 mg/ml (1%) 10 mg/mL (1 %) injection      Notes to Pharmacy: Created by cabinet override           Verify that the below list of medications is an accurate representation of the medications you are currently taking.  If none reported, the list may be blank. If incorrect, please contact your healthcare provider. Carry this list with you in case of emergency.           Current Medications     alendronate (FOSAMAX) 70 MG tablet take 1 Tablet by Oral route 1 time per week M81.0 med nec    clonazePAM (KLONOPIN) 1 MG tablet Take 1 mg by mouth 2 (two) times daily as needed.    duloxetine (CYMBALTA) 60 MG capsule Take 60 mg by mouth once daily.    gabapentin (NEURONTIN) 300 MG capsule TK 1 C  PO TID    gabapentin (NEURONTIN) 600 MG tablet TK 1 TABLET PO TID    gabapentin (NEURONTIN) 800 MG tablet Take 800 mg by mouth 3 (three) times daily.    mirtazapine (REMERON) 15 MG tablet Take 15 mg by mouth every evening.    nicotine (NICODERM CQ) 14 mg/24 hr apply one PATCH SKIN daily    tramadol (ULTRAM) 50 mg tablet Take 1 tablet (50 mg total) by mouth every 8 (eight) hours as needed for Pain.    hydrocodone-acetaminophen 5-325mg (NORCO) 5-325 mg per tablet Take 1 tablet by mouth every 8 (eight) hours as needed.           Clinical Reference Information           Your Vitals Were     BP                   167/86 (BP Location: Left arm, Patient Position: Sitting)           Allergies as of 3/1/2017        Reactions    Pcn [Penicillins]     Zithromax [Azithromycin]       Immunizations Administered on Date of Encounter - 3/1/2017     None      ED Micro, Lab, POCT     None      ED Imaging Orders     Start Ordered       Status Ordering Provider    03/01/17 1604 03/01/17 1603  X-Ray Wrist Complete Right  1 time imaging      Final result     03/01/17 1425 03/01/17 1424    1 time imaging,   Status:  Canceled      Canceled     03/01/17 1424 03/01/17 1424    1 time imaging,   Status:  Canceled      Canceled     03/01/17 1416 03/01/17 1416  X-Ray Forearm Right  1 time imaging      Final result     03/01/17 1416 03/01/17 1416  X-Ray Wrist Complete Right  1 time imaging      Final result     03/01/17 1416 03/01/17 1416  X-Ray Hand 3 view Right  1 time imaging      Final result       Discharge References/Attachments     RADIUS AND ULNA FRACTURE, REDUCTION REQUIRED (ENGLISH)    SPLINT CARE, DISCHARGE INSTRUCTIONS (ENGLISH)      Smoking Cessation     If you would like to quit smoking:   You may be eligible for free services if you are a [place] resident and started smoking cigarettes before September 1, 1988.  Call the Smoking Cessation Trust ([facility]) toll free at [phone] 755-7982 or [phone] [phone].   Call 9-827-QUIT-NOW if you do  not meet the above criteria.             [facility] complies with applicable Federal civil rights laws and does not discriminate on the basis of race, color, national origin, age, disability, or sex.        Language Assistance Services     ATTENTION: Language assistance services are available, free of charge. Please call [phone].      ATENCIÓN: Si habla español, tiene a montanez disposición servicios gratuitos de asistencia lingüística. Llame al [phone].     CHÚ Ý: N?u b?n nói Ti?ng Vi?t, có các d?ch v? h? tr? ngôn ng? mi?n phí dành cho b?n. G?i s? [phone].

## 2017-03-01 NOTE — ED PROVIDER NOTES
Encounter Date: 3/1/2017       History     Chief Complaint   Patient presents with    Arm Injury     SItting back in a lawn chair and it tipped over backwards, put arm out to stop fall.  Arm and hand swollen, red,and painful.      Review of patient's allergies indicates:   Allergen Reactions    Pcn [penicillins]     Zithromax [azithromycin]      HPI Comments: Patient is a 52 year old female with complaint of right wrist pain. She has PMH significant for hypertension. She reports she was sitting in a lawn chair and leaned back, falling and catching herself with her right hand. She reports the event happened about 15 hours ago. She reports constant, severe pain that is worse with movement. She reports associated swelling. She reports previous injury to wrist when she was a child. She reports taking over the counter medication for pain with no relief. She denied numbness/tingling, fever, chills or redness to arm.     The history is provided by the patient and the spouse.     Past Medical History:   Diagnosis Date    Hypertension      Past Surgical History:   Procedure Laterality Date    BLADDER SURGERY      GALLBLADDER SURGERY       No family history on file.  Social History   Substance Use Topics    Smoking status: Current Every Day Smoker    Smokeless tobacco: Not on file    Alcohol use Not on file     Review of Systems   Constitutional: Negative for chills and fever.   HENT: Negative for sore throat.    Respiratory: Negative for shortness of breath.    Cardiovascular: Negative for chest pain.   Gastrointestinal: Negative for nausea.   Genitourinary: Negative for dysuria.   Musculoskeletal: Negative for back pain.        + right wrist pain   Skin: Negative for rash.   Neurological: Negative for weakness.   Hematological: Does not bruise/bleed easily.       Physical Exam   Initial Vitals   BP Pulse Resp Temp SpO2   03/01/17 1411 03/01/17 1411 03/01/17 1411 03/01/17 1411 03/01/17 1411   167/86 94 20 98.8 °F  (37.1 °C) 100 %     Physical Exam    Nursing note and vitals reviewed.  Constitutional: She appears well-developed and well-nourished. No distress.   HENT:   Head: Normocephalic and atraumatic.   Right Ear: External ear normal.   Left Ear: External ear normal.   Nose: Nose normal.   Eyes: Conjunctivae are normal. Pupils are equal, round, and reactive to light. Right eye exhibits no discharge. Left eye exhibits no discharge.   Neck: Normal range of motion. Neck supple.   Cardiovascular: Normal rate, regular rhythm and normal heart sounds. Exam reveals no gallop and no friction rub.    No murmur heard.  Pulmonary/Chest: Breath sounds normal. She has no wheezes. She has no rhonchi. She has no rales.   Abdominal: Soft. Bowel sounds are normal. There is no tenderness. There is no guarding.   Musculoskeletal:        Right wrist: She exhibits decreased range of motion, tenderness, bony tenderness, swelling and deformity.        Right hand: She exhibits decreased range of motion, tenderness, bony tenderness and swelling. She exhibits normal capillary refill. Normal sensation noted. Decreased strength noted.   Neurological: She is alert.   Skin: Skin is warm and dry.         ED Course   Orthopedic Injury  Date/Time: 3/1/2017 6:46 PM  Authorized by: KARTIK CARSON   Performed by: KARTIK CARSON  Injury location: wrist  Location details: right wrist  Injury type: fracture  Fracture type: distal radius  Pre-procedure distal perfusion: normal  Pre-procedure neurological function: normal  Pre-procedure neurovascular assessment: neurovascularly intact  Pre-procedure range of motion: reduced  Pre-procedure range of motion comment: at wrist  Local anesthesia used: yes    Anesthesia:  Local anesthesia used: yes  Anesthesia: hematoma block  Local Anesthetic: lidocaine 1% without epinephrine   Anesthesia: hematoma block  Sedation:  Patient sedated: yes  Sedation type: anxiolysis   Sedatives: midazolam and morphine    Manipulation  performed: yes  Skin traction used: yes  Skeletal traction used: yes  Reduction successful: yes  X-ray confirmed reduction: yes  Immobilization: splint  Splint type: sugar tong  Supplies used: Ortho-Glass  Complications: No  Estimated blood loss (mL): 0  Specimens: No  Implants: No  Post-procedure neurovascular assessment: post-procedure neurovascularly intact  Post-procedure distal perfusion: normal  Post-procedure neurological function: normal  Post-procedure range of motion: improved  Patient tolerance: Patient tolerated the procedure well with no immediate complications        Labs Reviewed - No data to display          Medical Decision Making:   History:   I obtained history from: someone other than patient.  Old Medical Records: I decided to obtain old medical records.  Clinical Tests:   Radiological Study: Ordered       APC / Resident Notes:   This is an emergent evaluation 49-year-old female with complaint of right wrist pain.  She sustained a FOOSH injury aside around 10 PM.  She reports the swelling and pain.  Decreased range of motion secondary to pain.  There is obvious deformity noted to the right wrist.  There is swelling, ecchymosis and diffuse tenderness to palpation.  There is pain with range of motion.  There is no erythema or warmth concerning for septic joint.  X-ray shows acute fracture and displacement.  Manipulation performed by Dr. Summers with improvement in alignment, confirmed by repeat x-ray.  Splint placed.  Orthopedic follow-up. Discussed results with patient. Return precautions given. Patient is to follow up with their primary care provider and ortho. Case was discussed with Dr. Summers who is in agreement with the plan of care. All questions answered.            Attending Attestation:     Physician Attestation Statement for NP/PA:   I have conducted a face to face encounter with this patient in addition to the NP/PA, due to Medical Complexity    Other NP/PA Attestation Additions:       Medical Decision Making: Patient has a right mildly displaced and angulated Colles' fracture status post fall.  Distal perfusion and motor/sensory nerve function is intact.  Fracture reduced by me.  Please see procedure note further details.  Splint applied.  Patient to follow-up with orthopedics or return for any concerns.                 ED Course     Clinical Impression:   The primary encounter diagnosis was Other closed fracture of distal end of right radius, initial encounter. Diagnoses of Fall, Fall at home, initial encounter, and Fracture were also pertinent to this visit.    Disposition:   Disposition: Discharged  Condition: Stable       Anastasiya Rivesr PA-C  03/01/17 1852       Sukhdev Summers MD  03/04/17 0011

## 2017-03-09 ENCOUNTER — HOSPITAL ENCOUNTER (OUTPATIENT)
Dept: RADIOLOGY | Facility: HOSPITAL | Age: 52
Discharge: HOME | End: 2017-03-09
Attending: ORTHOPAEDIC SURGERY
Payer: MEDICAID

## 2017-03-09 ENCOUNTER — OFFICE VISIT (OUTPATIENT)
Dept: ORTHOPEDICS | Facility: CLINIC | Age: 52
End: 2017-03-09
Payer: MEDICAID

## 2017-03-09 VITALS — BODY MASS INDEX: 25.44 KG/M2 | WEIGHT: 149 LBS | HEIGHT: 64 IN

## 2017-03-09 DIAGNOSIS — G89.4 CHRONIC PAIN SYNDROME: ICD-10-CM

## 2017-03-09 DIAGNOSIS — M25.531 RIGHT WRIST PAIN: ICD-10-CM

## 2017-03-09 DIAGNOSIS — M25.531 RIGHT WRIST PAIN: Primary | ICD-10-CM

## 2017-03-09 DIAGNOSIS — S52.509A CLOSED FRACTURE OF DISTAL END OF RADIUS, UNSPECIFIED FRACTURE MORPHOLOGY, UNSPECIFIED LATERALITY, INITIAL ENCOUNTER: ICD-10-CM

## 2017-03-09 PROCEDURE — 73110 X-RAY EXAM OF WRIST: CPT | Mod: 26,RT,, | Performed by: RADIOLOGY

## 2017-03-09 PROCEDURE — 99999 PR PBB SHADOW E&M-EST. PATIENT-LVL II: CPT | Mod: PBBFAC,,, | Performed by: ORTHOPAEDIC SURGERY

## 2017-03-09 PROCEDURE — 99204 OFFICE O/P NEW MOD 45 MIN: CPT | Mod: 25,S$PBB,, | Performed by: ORTHOPAEDIC SURGERY

## 2017-03-09 PROCEDURE — 73110 X-RAY EXAM OF WRIST: CPT | Mod: TC,PO,RT

## 2017-03-09 PROCEDURE — 25600 CLTX DST RDL FX/EPHYS SEP WO: CPT | Mod: S$PBB,,, | Performed by: ORTHOPAEDIC SURGERY

## 2017-03-09 PROCEDURE — 97760 ORTHOTIC MGMT&TRAING 1ST ENC: CPT | Mod: GP,,, | Performed by: ORTHOPAEDIC SURGERY

## 2017-03-09 RX ORDER — BUDESONIDE AND FORMOTEROL FUMARATE DIHYDRATE 160; 4.5 UG/1; UG/1
AEROSOL RESPIRATORY (INHALATION)
Refills: 5 | COMMUNITY
Start: 2017-02-20

## 2017-03-09 RX ORDER — LISINOPRIL 5 MG/1
TABLET ORAL
Refills: 3 | Status: ON HOLD | COMMUNITY
Start: 2017-02-11 | End: 2017-03-11 | Stop reason: HOSPADM

## 2017-03-09 RX ORDER — MIRTAZAPINE 30 MG/1
30 TABLET, FILM COATED ORAL NIGHTLY
Refills: 1 | COMMUNITY
Start: 2017-02-15

## 2017-03-09 RX ORDER — SENNOSIDES 8.6 MG/1
2 TABLET, FILM COATED ORAL NIGHTLY
Refills: 11 | Status: ON HOLD | COMMUNITY
Start: 2017-02-06 | End: 2017-03-10 | Stop reason: CLARIF

## 2017-03-09 RX ORDER — UMECLIDINIUM 62.5 UG/1
AEROSOL, POWDER ORAL
Refills: 1 | COMMUNITY
Start: 2017-02-13

## 2017-03-09 RX ORDER — ALENDRONATE SODIUM 70 MG/1
TABLET ORAL
Refills: 3 | COMMUNITY
Start: 2017-02-11

## 2017-03-09 RX ORDER — HYDROCHLOROTHIAZIDE 25 MG/1
25 TABLET ORAL DAILY
Refills: 0 | Status: ON HOLD | COMMUNITY
Start: 2017-01-20 | End: 2017-03-11 | Stop reason: HOSPADM

## 2017-03-09 NOTE — PROGRESS NOTES
Elaine Arias, a 52-year-old, right-hand dominant, works in [employer], injured her   right wrist about a week ago when she felt off a lawn chair.  Rates pain as 4/10   on the pain scale.  She had a reduction elsewhere, comes today with a   sugar-tong splint.    PHYSICAL EXAMINATION:  Today shows she has swelling and tenderness about the   distal radius.  No signs of infection.  Skin is intact.  Tenderness at the site,   nontender at the elbow or shoulder.  Compartments are soft.    Injury x-rays, post-reduction x-rays and x-rays from today showed a distal   radial fracture that has settled back down to closer are negative initial injury   position with apex volar angulation shortening as well as loss of radial   inclination and height.    ASSESSMENT:  Displaced distal radial fracture.    PLAN:  We will treat this nonoperatively with wrist and forearm immobilizer.  We   discussed limitations that she may have once this has healed with angular   deformity and some limitation of motion and strength.  We will check her back in   a few weeks' time as a postoperative visit with x-rays of her right wrist.      /darshan [id number] varsha(s)        PBB/PN  dd: 03/09/2017 14:58:38 (CST)  td: 03/09/2017 15:41:38 (CST)  Doc ID   #[id number]  Job ID #[id number]    CC:     Further History  Aching pain  Worse with activity  Relieved with rest  No other associated symptoms  No other radiation    Further Exam  Alert and oriented  Pleasant  Contralateral limb has appropriate range of motion for age and condition  Contralateral limb has appropriate strength for age and condition  Contralateral limb has appropriate stability  for age and condition  No adenopathy  Pulses are appropriate for current condition  Skin is intact        Chief Complaint    Chief Complaint   Patient presents with    Wrist Pain     right       HPI  Elaine Arias is a 52 y.o.  female who presents with       Past Medical History  Past Medical History:   Diagnosis Date     Hypertension        Past Surgical History  Past Surgical History:   Procedure Laterality Date    BLADDER SURGERY      GALLBLADDER SURGERY         Medications  Current Outpatient Prescriptions   Medication Sig    alendronate (FOSAMAX) 70 MG tablet take 1 Tablet by Oral route 1 time per week M81.0 med nec    amlodipine (NORVASC) 10 MG tablet Take 10 mg by mouth once daily.    clonazePAM (KLONOPIN) 1 MG tablet Take 1 mg by mouth 2 (two) times daily as needed.    docusate sodium (COLACE) 100 MG capsule Take 100 mg by mouth 2 (two) times daily.    duloxetine (CYMBALTA) 60 MG capsule Take 60 mg by mouth once daily.    FOSAMAX 70 mg tablet TAKE ONE TABLET BY MOUTH ONCE WEEKLY    gabapentin (NEURONTIN) 300 MG capsule TK 1 C PO TID    gabapentin (NEURONTIN) 600 MG tablet TK 1 TABLET PO TID    gabapentin (NEURONTIN) 800 MG tablet Take 800 mg by mouth 3 (three) times daily.    hydrochlorothiazide (HYDRODIURIL) 25 MG tablet Take 25 mg by mouth once daily.    hydrocodone-acetaminophen 5-325mg (NORCO) 5-325 mg per tablet Take 1 tablet by mouth every 8 (eight) hours as needed.    hydrOXYzine HCl (ATARAX) 25 MG tablet Take 25 mg by mouth 2 (two) times daily.    INCRUSE ELLIPTA 62.5 mcg/actuation DsDv USE ONE PUFF BY MOUTH EVERY DAY    lisinopril (PRINIVIL,ZESTRIL) 5 MG tablet Take 1 tablet(s) every day by oral route.    lisinopril-hydrochlorothiazide (PRINZIDE,ZESTORETIC) 20-25 mg Tab Take 1 tablet by mouth once daily.    mirtazapine (REMERON) 15 MG tablet Take 15 mg by mouth every evening.    mirtazapine (REMERON) 30 MG tablet Take 30 mg by mouth every evening.    NATURAL VEG LAXATIVE,SENNOSID, 8.6 mg tablet Take 2 tablets by mouth every evening.    nicotine (NICODERM CQ) 14 mg/24 hr apply one PATCH SKIN daily    SYMBICORT 160-4.5 mcg/actuation HFAA TAKE TWO PUFFS BY MOUTH TWICE DAILY    tizanidine (ZANAFLEX) 2 MG tablet Take 2 mg by mouth 2 (two) times daily.    tramadol (ULTRAM) 50 mg tablet Take 1 tablet  (50 mg total) by mouth every 8 (eight) hours as needed for Pain.     No current facility-administered medications for this visit.        Allergies  Review of patient's allergies indicates:   Allergen Reactions    Pcn [penicillins]     Zithromax [azithromycin]        Family History  History reviewed. No pertinent family history.    Social History  Social History     Social History    Marital status: Single     Spouse name: N/A    Number of children: N/A    Years of education: N/A     Occupational History    Not on file.     Social History Main Topics    Smoking status: Current Every Day Smoker    Smokeless tobacco: Not on file    Alcohol use Not on file    Drug use: Not on file    Sexual activity: Not on file     Other Topics Concern    Not on file     Social History Narrative               Review of Systems     Constitutional: Negative    HENT: Negative  Eyes: Negative  Respiratory: Negative  Cardiovascular: Negative  Musculoskeletal: HPI  Skin: Negative  Neurological: Negative  Hematological: Negative  Endocrine: Negative                 Physical Exam    There were no vitals filed for this visit.  Body mass index is 25.58 kg/(m^2).  Physical Examination:     General appearance -  well appearing, and in no distress  Mental status - awake  Neck - supple  Chest -  symmetric air entry  Heart - normal rate   Abdomen - soft      Assessment     1. Right wrist pain    2. Chronic pain syndrome    3. Closed fracture of distal end of radius, unspecified fracture morphology, unspecified laterality, initial encounter          Plan

## 2017-03-09 NOTE — MR AVS SNAPSHOT
[facility]  [address] [facility] [address]  [place] [address]  Phone: [phone]                  Elaine Arias   3/9/2017 1:30 PM   Office Visit    Description:  Female : 1965   Provider:  Patrick Stephens MD   Department:  [facility]           Reason for Visit     Wrist Pain           Diagnoses this Visit        Comments    Right wrist pain    -  Primary     Chronic pain syndrome                To Do List           Future Appointments        Provider Department Dept Phone    3/30/2017 1:00 PM Patrick Stephens MD [facility] [phone]      Goals (5 Years of Data)     None      [facility]     [facility] On Call Nurse Care Line -  Assistance  Registered nurses in the [facility] On Call Center provide clinical advisement, health education, appointment booking, and other advisory services.  Call for this free service at [phone].             Medications           Message regarding Medications     Verify the changes and/or additions to your medication regime listed below are the same as discussed with your clinician today.  If any of these changes or additions are incorrect, please notify your healthcare provider.             Verify that the below list of medications is an accurate representation of the medications you are currently taking.  If none reported, the list may be blank. If incorrect, please contact your healthcare provider. Carry this list with you in case of emergency.           Current Medications     alendronate (FOSAMAX) 70 MG tablet take 1 Tablet by Oral route 1 time per week M81.0 med nec    amlodipine (NORVASC) 10 MG tablet Take 10 mg by mouth once daily.    clonazePAM (KLONOPIN) 1 MG tablet Take 1 mg by mouth 2 (two) times daily as needed.    docusate sodium (COLACE) 100 MG capsule Take 100 mg by mouth 2 (two) times daily.    duloxetine (CYMBALTA) 60 MG capsule Take 60 mg by mouth once daily.    FOSAMAX 70 mg tablet TAKE ONE TABLET BY  "MOUTH ONCE WEEKLY    gabapentin (NEURONTIN) 300 MG capsule TK 1 C PO TID    gabapentin (NEURONTIN) 600 MG tablet TK 1 TABLET PO TID    gabapentin (NEURONTIN) 800 MG tablet Take 800 mg by mouth 3 (three) times daily.    hydrochlorothiazide (HYDRODIURIL) 25 MG tablet Take 25 mg by mouth once daily.    hydrocodone-acetaminophen 5-325mg (NORCO) 5-325 mg per tablet Take 1 tablet by mouth every 8 (eight) hours as needed.    hydrOXYzine HCl (ATARAX) 25 MG tablet Take 25 mg by mouth 2 (two) times daily.    INCRUSE ELLIPTA 62.5 mcg/actuation DsDv USE ONE PUFF BY MOUTH EVERY DAY    lisinopril (PRINIVIL,ZESTRIL) 5 MG tablet Take 1 tablet(s) every day by oral route.    lisinopril-hydrochlorothiazide (PRINZIDE,ZESTORETIC) 20-25 mg Tab Take 1 tablet by mouth once daily.    mirtazapine (REMERON) 15 MG tablet Take 15 mg by mouth every evening.    mirtazapine (REMERON) 30 MG tablet Take 30 mg by mouth every evening.    NATURAL VEG LAXATIVE,SENNOSID, 8.6 mg tablet Take 2 tablets by mouth every evening.    nicotine (NICODERM CQ) 14 mg/24 hr apply one PATCH SKIN daily    SYMBICORT 160-4.5 mcg/actuation HFAA TAKE TWO PUFFS BY MOUTH TWICE DAILY    tizanidine (ZANAFLEX) 2 MG tablet Take 2 mg by mouth 2 (two) times daily.    tramadol (ULTRAM) 50 mg tablet Take 1 tablet (50 mg total) by mouth every 8 (eight) hours as needed for Pain.           Clinical Reference Information           Your Vitals Were     Height Weight BMI          5' 4" (1.626 m) 67.6 kg (149 lb) 25.58 kg/m2        Allergies as of 3/9/2017     Pcn [Penicillins]    Zithromax [Azithromycin]      Immunizations Administered on Date of Encounter - 3/9/2017     None      Orders Placed During Today's Visit     Future Labs/Procedures Expected by Expires    X-Ray Wrist Complete Right  3/9/2017 3/9/2018      Smoking Cessation     If you would like to quit smoking:   You may be eligible for free services if you are a [place] resident and started smoking cigarettes before September " 1, 1988.  Call the [employer]oking [employer]essation [employer] ([employer]) toll free at (226) 420-4027 or (573) 701-4806.   Call 1-800-QUIT-NOW if you do not meet the above criteria.            Language Assistance Services     ATTENTION: Language assistance services are available, free of charge. Please call [phone].      ATENCIÓN: Si habla español, tiene a montanez disposición servicios gratuitos de asistencia lingüística. Llame al [phone].     CHÚ Ý: N?u b?n nói Ti?ng Vi?t, có các d?ch v? h? tr? ngôn ng? mi?n phí dành cho b?n. G?i s? [phone].         [facility] complies with applicable Federal civil rights laws and does not discriminate on the basis of race, color, national origin, age, disability, or sex.

## 2017-03-10 PROBLEM — R56.9 SEIZURE: Status: ACTIVE | Noted: 2017-03-10

## 2017-03-10 PROBLEM — M54.50 CHRONIC LUMBAR PAIN: Status: ACTIVE | Noted: 2017-03-10

## 2017-03-10 PROBLEM — G89.29 CHRONIC LUMBAR PAIN: Status: ACTIVE | Noted: 2017-03-10

## 2017-03-28 DIAGNOSIS — Z09 FRACTURE FOLLOW-UP: Primary | ICD-10-CM

## 2017-03-30 ENCOUNTER — OFFICE VISIT (OUTPATIENT)
Dept: ORTHOPEDICS | Facility: CLINIC | Age: 52
End: 2017-03-30
Payer: MEDICAID

## 2017-03-30 ENCOUNTER — HOSPITAL ENCOUNTER (OUTPATIENT)
Dept: RADIOLOGY | Facility: HOSPITAL | Age: 52
Discharge: HOME | End: 2017-03-30
Attending: ORTHOPAEDIC SURGERY
Payer: MEDICAID

## 2017-03-30 VITALS — HEIGHT: 64 IN | BODY MASS INDEX: 24.59 KG/M2 | WEIGHT: 144 LBS

## 2017-03-30 DIAGNOSIS — Z09 FRACTURE FOLLOW-UP: ICD-10-CM

## 2017-03-30 DIAGNOSIS — S52.501D CLOSED FRACTURE OF DISTAL END OF RIGHT RADIUS WITH ROUTINE HEALING, UNSPECIFIED FRACTURE MORPHOLOGY, SUBSEQUENT ENCOUNTER: Primary | ICD-10-CM

## 2017-03-30 PROCEDURE — 99024 POSTOP FOLLOW-UP VISIT: CPT | Mod: ,,, | Performed by: ORTHOPAEDIC SURGERY

## 2017-03-30 PROCEDURE — 73110 X-RAY EXAM OF WRIST: CPT | Mod: 26,RT,, | Performed by: RADIOLOGY

## 2017-03-30 PROCEDURE — 99999 PR PBB SHADOW E&M-EST. PATIENT-LVL II: CPT | Mod: PBBFAC,,, | Performed by: ORTHOPAEDIC SURGERY

## 2017-03-30 PROCEDURE — 99212 OFFICE O/P EST SF 10 MIN: CPT | Mod: PBBFAC,PO | Performed by: ORTHOPAEDIC SURGERY

## 2017-03-30 NOTE — PROGRESS NOTES
Elaine Arias, 52 years old, followup of radial fracture.  She has settled down   even more from a previous x-ray.  Again, she had a closed reduction done   elsewhere.  She has gone back to original injury position.  We discussed with   her the limitations of this current treatment plan.  She is aware of this and   still wants to proceed.  We are going to continue with the nonoperative   approach.  At this point, we are going to continue the brace.  We are going to   implement gentle daily range of motion as she is one month out.  We will check   her back in one month's time, she will be two months out, we will get repeat   x-rays of her right wrist as a postoperative visit.      AIMEE/MATT  dd: 03/30/2017 13:49:57 (CDT)  td: 03/31/2017 12:06:47 (CDT)  Doc ID   #[id number]  Job ID #[id number]    CC:

## 2017-04-04 DIAGNOSIS — Z01.818 PRE-OP TESTING: Primary | ICD-10-CM

## 2017-04-04 DIAGNOSIS — M48.061 LUMBAR STENOSIS: ICD-10-CM

## 2017-04-04 DIAGNOSIS — M48.062 LUMBAR STENOSIS WITH NEUROGENIC CLAUDICATION: Primary | ICD-10-CM

## 2017-04-04 RX ORDER — SODIUM CHLORIDE 9 MG/ML
INJECTION, SOLUTION INTRAVENOUS CONTINUOUS
Status: CANCELLED | OUTPATIENT
Start: 2017-04-04

## 2017-04-25 ENCOUNTER — TELEPHONE (OUTPATIENT)
Dept: NEUROSURGERY | Facility: CLINIC | Age: 52
End: 2017-04-25

## 2017-04-25 NOTE — TELEPHONE ENCOUNTER
Called pt x 2 after noticing that pre op appt from last week was cancelled at [facility]. No answer. Left voicemail.